# Patient Record
Sex: MALE | Race: ASIAN
[De-identification: names, ages, dates, MRNs, and addresses within clinical notes are randomized per-mention and may not be internally consistent; named-entity substitution may affect disease eponyms.]

---

## 2017-01-19 ENCOUNTER — APPOINTMENT (OUTPATIENT)
Dept: ENDOCRINOLOGY | Facility: CLINIC | Age: 67
End: 2017-01-19

## 2017-04-10 ENCOUNTER — RX RENEWAL (OUTPATIENT)
Age: 67
End: 2017-04-10

## 2017-06-14 ENCOUNTER — RX RENEWAL (OUTPATIENT)
Age: 67
End: 2017-06-14

## 2017-08-14 ENCOUNTER — MEDICATION RENEWAL (OUTPATIENT)
Age: 67
End: 2017-08-14

## 2017-08-14 RX ORDER — BLOOD SUGAR DIAGNOSTIC
STRIP MISCELLANEOUS
Qty: 500 | Refills: 3 | Status: ACTIVE | COMMUNITY
Start: 2017-08-14 | End: 1900-01-01

## 2017-08-14 RX ORDER — LANCETS 33 GAUGE
EACH MISCELLANEOUS
Qty: 500 | Refills: 3 | Status: ACTIVE | COMMUNITY
Start: 2017-08-14 | End: 1900-01-01

## 2017-08-14 RX ORDER — BLOOD-GLUCOSE METER
W/DEVICE KIT MISCELLANEOUS
Qty: 1 | Refills: 1 | Status: ACTIVE | COMMUNITY
Start: 2017-08-14 | End: 1900-01-01

## 2018-04-16 ENCOUNTER — RX RENEWAL (OUTPATIENT)
Age: 68
End: 2018-04-16

## 2019-04-30 ENCOUNTER — RX RENEWAL (OUTPATIENT)
Age: 69
End: 2019-04-30

## 2020-04-25 ENCOUNTER — MESSAGE (OUTPATIENT)
Age: 70
End: 2020-04-25

## 2022-11-05 ENCOUNTER — OFFICE (OUTPATIENT)
Dept: URBAN - METROPOLITAN AREA CLINIC 88 | Facility: CLINIC | Age: 72
Setting detail: OPHTHALMOLOGY
End: 2022-11-05
Payer: COMMERCIAL

## 2022-11-05 DIAGNOSIS — H43.813: ICD-10-CM

## 2022-11-05 DIAGNOSIS — E11.3513: ICD-10-CM

## 2022-11-05 DIAGNOSIS — H35.372: ICD-10-CM

## 2022-11-05 PROCEDURE — 67028 INJECTION EYE DRUG: CPT | Performed by: OPHTHALMOLOGY

## 2022-11-05 PROCEDURE — 92134 CPTRZ OPH DX IMG PST SGM RTA: CPT | Performed by: OPHTHALMOLOGY

## 2022-11-05 ASSESSMENT — KERATOMETRY
OD_K2POWER_DIOPTERS: 44.25
OS_K2POWER_DIOPTERS: 44.75
OS_AXISANGLE_DEGREES: 006
OD_AXISANGLE_DEGREES: 122
OD_K1POWER_DIOPTERS: 43.00
OS_K1POWER_DIOPTERS: 43.75

## 2022-11-05 ASSESSMENT — REFRACTION_AUTOREFRACTION
OD_SPHERE: +1.00
OS_SPHERE: +1.50
OS_AXIS: 092
OD_CYLINDER: -1.50
OD_AXIS: 042
OS_CYLINDER: -2.00

## 2022-11-05 ASSESSMENT — TONOMETRY
OD_IOP_MMHG: 16
OS_IOP_MMHG: 18

## 2022-11-05 ASSESSMENT — LID POSITION - DERMATOCHALASIS
OS_DERMATOCHALASIS: LUL 2+ 3+
OD_DERMATOCHALASIS: RUL 2+ 3+

## 2022-11-05 ASSESSMENT — VISUAL ACUITY
OS_BCVA: 20/40
OD_BCVA: 20/40

## 2022-11-05 ASSESSMENT — LID POSITION - PTOSIS
OD_PTOSIS: RUL 2+ 3+
OS_PTOSIS: LUL 2+ 3+

## 2022-11-05 ASSESSMENT — SPHEQUIV_DERIVED
OD_SPHEQUIV: 0.25
OS_SPHEQUIV: 0.5

## 2022-11-05 ASSESSMENT — CONFRONTATIONAL VISUAL FIELD TEST (CVF)
OS_FINDINGS: FULL
OD_FINDINGS: FULL

## 2022-11-05 ASSESSMENT — AXIALLENGTH_DERIVED
OS_AL: 23.1308
OD_AL: 23.4495

## 2022-11-05 ASSESSMENT — CORNEAL PTERYGIUM: OS_PTERYGIUM: NASAL 1MM

## 2023-04-06 ENCOUNTER — OFFICE (OUTPATIENT)
Dept: URBAN - METROPOLITAN AREA CLINIC 12 | Facility: CLINIC | Age: 73
Setting detail: OPHTHALMOLOGY
End: 2023-04-06
Payer: COMMERCIAL

## 2023-04-06 DIAGNOSIS — H52.7: ICD-10-CM

## 2023-04-06 DIAGNOSIS — E11.3511: ICD-10-CM

## 2023-04-06 DIAGNOSIS — H11.151: ICD-10-CM

## 2023-04-06 DIAGNOSIS — H43.813: ICD-10-CM

## 2023-04-06 DIAGNOSIS — H02.831: ICD-10-CM

## 2023-04-06 DIAGNOSIS — H11.042: ICD-10-CM

## 2023-04-06 DIAGNOSIS — H02.834: ICD-10-CM

## 2023-04-06 DIAGNOSIS — H35.372: ICD-10-CM

## 2023-04-06 DIAGNOSIS — H02.403: ICD-10-CM

## 2023-04-06 DIAGNOSIS — H40.013: ICD-10-CM

## 2023-04-06 DIAGNOSIS — E11.3512: ICD-10-CM

## 2023-04-06 PROCEDURE — 92250 FUNDUS PHOTOGRAPHY W/I&R: CPT | Performed by: STUDENT IN AN ORGANIZED HEALTH CARE EDUCATION/TRAINING PROGRAM

## 2023-04-06 PROCEDURE — 92015 DETERMINE REFRACTIVE STATE: CPT | Performed by: STUDENT IN AN ORGANIZED HEALTH CARE EDUCATION/TRAINING PROGRAM

## 2023-04-06 PROCEDURE — 92014 COMPRE OPH EXAM EST PT 1/>: CPT | Performed by: STUDENT IN AN ORGANIZED HEALTH CARE EDUCATION/TRAINING PROGRAM

## 2023-04-06 ASSESSMENT — KERATOMETRY
OS_K2POWER_DIOPTERS: 44.25
OS_AXISANGLE_DEGREES: 23
OD_K1POWER_DIOPTERS: 42.50
OD_AXISANGLE_DEGREES: 149
OD_K2POWER_DIOPTERS: 45.00
OS_K1POWER_DIOPTERS: 43.50

## 2023-04-06 ASSESSMENT — REFRACTION_AUTOREFRACTION
OD_SPHERE: +2.25
OD_CYLINDER: -1.50
OD_AXIS: 84
OS_SPHERE: +1.25
OS_AXIS: 94
OS_CYLINDER: -1.50

## 2023-04-06 ASSESSMENT — TONOMETRY: OS_IOP_MMHG: 19

## 2023-04-06 ASSESSMENT — REFRACTION_CURRENTRX
OD_VPRISM_DIRECTION: SV
OS_AXIS: 47
OD_SPHERE: +4.00
OD_OVR_VA: 20/
OS_CYLINDER: -1.25
OD_CYLINDER: -1.25
OS_OVR_VA: 20/
OS_VPRISM_DIRECTION: SV
OD_AXIS: 50
OS_SPHERE: +4.00

## 2023-04-06 ASSESSMENT — REFRACTION_MANIFEST
OS_SPHERE: +1.25
OD_CYLINDER: -1.50
OS_ADD: +3.00
OD_ADD: +3.00
OS_CYLINDER: -1.50
OD_SPHERE: +2.50
OS_VA1: 20/40-2
OS_AXIS: 095
OD_VA1: 20/60+2
OD_AXIS: 075

## 2023-04-06 ASSESSMENT — CONFRONTATIONAL VISUAL FIELD TEST (CVF)
OS_FINDINGS: FULL
OD_FINDINGS: FULL

## 2023-04-06 ASSESSMENT — VISUAL ACUITY
OS_BCVA: 20/60
OD_BCVA: 20/40-2

## 2023-04-06 ASSESSMENT — CORNEAL PTERYGIUM: OS_PTERYGIUM: NASAL 1MM

## 2023-04-06 ASSESSMENT — SPHEQUIV_DERIVED
OD_SPHEQUIV: 1.5
OS_SPHEQUIV: 0.5
OS_SPHEQUIV: 0.5
OD_SPHEQUIV: 1.75

## 2023-04-06 ASSESSMENT — AXIALLENGTH_DERIVED
OS_AL: 23.264
OD_AL: 22.8428
OD_AL: 22.9345
OS_AL: 23.264

## 2023-04-06 ASSESSMENT — LID POSITION - PTOSIS
OD_PTOSIS: RUL 2+ 3+
OS_PTOSIS: LUL 2+ 3+

## 2023-04-06 ASSESSMENT — LID POSITION - DERMATOCHALASIS
OD_DERMATOCHALASIS: RUL 2+ 3+
OS_DERMATOCHALASIS: LUL 2+ 3+

## 2024-07-24 ENCOUNTER — APPOINTMENT (OUTPATIENT)
Dept: ORTHOPEDIC SURGERY | Facility: CLINIC | Age: 74
End: 2024-07-24
Payer: COMMERCIAL

## 2024-07-24 VITALS — HEIGHT: 68 IN | BODY MASS INDEX: 19.85 KG/M2 | WEIGHT: 131 LBS

## 2024-07-24 DIAGNOSIS — M25.561 PAIN IN RIGHT KNEE: ICD-10-CM

## 2024-07-24 DIAGNOSIS — M79.18 MYALGIA, OTHER SITE: ICD-10-CM

## 2024-07-24 DIAGNOSIS — G89.29 PAIN IN RIGHT KNEE: ICD-10-CM

## 2024-07-24 DIAGNOSIS — M25.562 PAIN IN LEFT KNEE: ICD-10-CM

## 2024-07-24 PROCEDURE — 73564 X-RAY EXAM KNEE 4 OR MORE: CPT | Mod: 50

## 2024-07-24 PROCEDURE — 99204 OFFICE O/P NEW MOD 45 MIN: CPT

## 2024-07-24 PROCEDURE — 72100 X-RAY EXAM L-S SPINE 2/3 VWS: CPT

## 2024-07-24 RX ORDER — CLOPIDOGREL 75 MG/1
TABLET, FILM COATED ORAL
Refills: 0 | Status: ACTIVE | COMMUNITY

## 2024-07-24 NOTE — DISCUSSION/SUMMARY
[de-identified] : Reviewed all X Ray images with patient today and interpretation was provided. Patient was given prescription of formal physical therapy that he will perform 2x/wk for 6-8 wks.  Explained to the patient that their pain could potentially be coming from the lumbar spine.  MRI of lumbar spine stenosis as a cause of his bilateral thigh weakness Follow up after MRI scan with spine services.  if weakness is not of spinal origin other possiblities include neurologic or vascular causes     ----------------------------------------------- Home Exercise The patient is instructed on a home exercise program.   TEO REAVES Acting as a Scribe for Dr. Cassie MARIO, Teo Reaves, attest that this documentation has been prepared under the direction and in the presence of Provider Dr. Matthews.   Activity Modification The patient was advised to modify their activities.   Dx / Natural History The patient was advised of the diagnosis.  The natural history of the pathology was explained in full to the patient in layman's terms.  Several different treatment options were discussed and explained in full to the patient including the risks and benefits of both surgical and non-surgical treatments.  All questions and concerns were answered.   Pain Guide Activities The patient was advised to let pain guide the gradual advancement of activities.   RICE I explained to the patient that rest, ice, compression, and elevation would benefit them.  They may return to activity after follow-up or when they no longer have any pain.   The patient's current medication management of their orthopedic diagnosis was reviewed today: (1) We discussed a comprehensive treatment plan that included possible pharmaceutical management involving the use of prescription strength medications including but not limited to options such as oral Naprosyn 500mg BID, once daily Meloxicam 15 mg, or 500-650 mg Tylenol versus over the counter oral medications and topical prescription NSAID Pennsaid vs over the counter Voltaren gel. (2) There is a moderate risk of morbidity with further treatment, especially from use of prescription strength medications and possible side effects of these medications which include upset stomach with oral medications, skin reactions to topical medications and cardiac/renal issues with long term use. (3) I recommended that the patient follow-up with their medical physician to discuss any significant specific potential issues with long term medication use such as interactions with current medications or with exacerbation of underlying medical comorbidities. (4) The benefits and risks associated with use of injectable, oral or topical, prescription and over the counter anti-inflammatory medications were discussed with the patient. The patient voiced understanding of the risks including but not limited to bleeding, stroke, kidney dysfunction, heart disease, and were referred to the black box warning label for further information.

## 2024-07-24 NOTE — HISTORY OF PRESENT ILLNESS
[de-identified] : The patient is a 73 year old [RIGHT/LEFT] hand dominant male who presents today complaining of BL knees.   Date of Injury/Onset: about 10 days ago  Pain:    At Rest: 0/10  With Activity:  7/10  Mechanism of injury: NKI  This is NOT a Work Related Injury being treated under Worker's Compensation. This is NOT an athletic injury occurring associated with an interscholastic or organized sports team. Quality of symptoms: tightness, aching  Improves with: walking around  Worse with: lifting his legs, bending forward, sitting  Prior treatment: n/a Prior Imaging: n/a Out of work/sport: _, since _ School/Sport/Position/Occupation:[employer] Additional Information: None

## 2024-07-24 NOTE — PHYSICAL EXAM
[de-identified] : Neurovascularly intact distally   Bilateral Knee: Full ROM Ligamental stable  Nontender no effusion  -homens test  Lumbar Spine: Radicular pain down the left/right legs Full ROM of back with pain

## 2024-07-24 NOTE — ADDENDUM
[FreeTextEntry1] :  Documented by Alycia Reaves acting as a scribe for Dr. Matthews and Tony Staton PA-C on 07/24/2024 and was presence for the following sections: Physical Exam; Data Reviewed; Assessment; Discussion/Summary. All medical record entries made by the Scribe were at my, Dr. Matthews, and Tony Staton, direction and personally dictated by me on 07/24/2024. I have reviewed the chart and agree that the record accurately reflects my personal performance of the history, physical exam, procedure and imaging.

## 2024-07-24 NOTE — DATA REVIEWED
[FreeTextEntry1] : 7/24/24 OC X RAY Bilateral Knee: 4 view: This scan was reviewed and interpreted by Dr. Matthews, and his findings are-  unremarkable   7/24/24 OC X RAY Lumbar Spine: 3 view: This scan was reviewed and interpreted by Dr. Matthews, and his findings are-   L5 S6 severe DDD

## 2024-07-29 ENCOUNTER — APPOINTMENT (OUTPATIENT)
Dept: MRI IMAGING | Facility: CLINIC | Age: 74
End: 2024-07-29
Payer: COMMERCIAL

## 2024-07-29 PROCEDURE — 72148 MRI LUMBAR SPINE W/O DYE: CPT

## 2024-07-31 ENCOUNTER — TRANSCRIPTION ENCOUNTER (OUTPATIENT)
Age: 74
End: 2024-07-31

## 2024-08-02 ENCOUNTER — APPOINTMENT (OUTPATIENT)
Dept: ORTHOPEDIC SURGERY | Facility: CLINIC | Age: 74
End: 2024-08-02
Payer: COMMERCIAL

## 2024-08-02 VITALS — WEIGHT: 131 LBS | HEIGHT: 68 IN | BODY MASS INDEX: 19.85 KG/M2

## 2024-08-02 DIAGNOSIS — M54.16 RADICULOPATHY, LUMBAR REGION: ICD-10-CM

## 2024-08-02 PROCEDURE — 99244 OFF/OP CNSLTJ NEW/EST MOD 40: CPT

## 2024-08-03 ENCOUNTER — APPOINTMENT (OUTPATIENT)
Dept: MRI IMAGING | Facility: CLINIC | Age: 74
End: 2024-08-03

## 2024-08-03 NOTE — DATA REVIEWED
[MRI] : MRI [Lumbar Spine] : lumbar spine [Report was reviewed and noted in the chart] : The report was reviewed and noted in the chart [I independently reviewed and interpreted images and report] : I independently reviewed and interpreted images and report [I reviewed the films/CD and additionally noted] : I reviewed the films/CD and additionally noted [FreeTextEntry1] : I stop paperwork reviewed Orthopedic progress notes reviewed

## 2024-08-03 NOTE — HISTORY OF PRESENT ILLNESS
[de-identified] : 08/02/2024 - 72 y/o M presenting for initial evaluation of weakness and pain into the bilateral lower extremities, sudden onset starting 2 weeks ago. No trauma or injury. Difficult getting in and out of car as he has to physically pick his legs up to move then. This has resulted in him using a walker. He has no loss of strength in his arms. Admits to minor low back pain, but primarily limited due to his lower extremity symptoms. Pain worse standing and walking, some relief sitting. Has control of bowel / bladder. Balance normal. Takes Tylenol prn for pain control. Has been oow since Monday due to symptoms. He is on plavix, baby aspirin. Cardiac stent Dec 2023. Controlled Diabetic.   The patient is a 73 year male who presents today complaining of low back pain, and b/l thigh/ calf weakness Date of Injury/Onset: 2 weeks Pain:    At Rest: 0/10  With Activity:  7/10  Mechanism of injury: NKI Quality of symptoms: tightness, achy, weakness Improves with: n/a Worse with: sitting to standing Prior treatment: n/a Prior Imaging: mri lumbar spine [facility], xrays [facility] Additional Information: None

## 2024-08-03 NOTE — DISCUSSION/SUMMARY
[de-identified] : The patient presents with acute lower extremity weakness, pain, and deterioration in walking. MRI reveals mild stenosis at L3/4 and L4/5. It was discussed that the nerve impingement evident on the MRI does not correlate with the severity of his symptoms.  Plan: An MRI of the thoracic spine is requested to evaluate for spinal cord compression. Recommend a consultation with a vascular specialist to rule out vascular abnormalities/arterial insufficiency, as the clinical examination demonstrates absent pulses. ALISSA referral issued He will follow up with PCP tomorrow. Patient remains temporarily and totally disabled from customary work at this time.  Follow-Up: Schedule a follow-up appointment after the thoracic MRI to reassess  Prior to appointment and during encounter with patient extensive medical records were reviewed including but not limited to, hospital records, outpatient records, imaging results, and lab data. During this appointment the patient was examined, diagnoses were discussed and explained in a face to face manner. In addition extensive time was spent reviewing aforementioned diagnostic studies. Counseling including abnormal image results, differential diagnoses, treatment options, risk and benefits, lifestyle changes, current condition, and current medications was performed. Patient's comments, questions, and concerns were addressed and patient verbalized understanding. Based on this patient's presentation at our office, which is an orthopedic spine surgeon's office, this patient inherently / intrinsically has a risk, however minute, of developing issues such as Cauda equina syndrome, bowel and bladder changes, or progression of motor or neurological deficits such as paralysis which may be permanent.  SNOW BUSTOS Acting as a Scribe for Dr. Stan MARIO, Snow Bustos, attest that this documentation has been prepared under the direction and in the presence of Provider Karl Pierce MD.

## 2024-08-03 NOTE — PHYSICAL EXAM
[Normal Coordination] : normal coordination [Normal DTR UE/LE] : normal DTR UE/LE  [Normal Sensation] : normal sensation [Normal Mood and Affect] : normal mood and affect [Oriented] : oriented [Able to Communicate] : able to communicate [Normal Skin] : normal skin [No Rash] : no rash [No Ulcers] : no ulcers [No Lesions] : no lesions [No obvious lymphadenopathy in areas examined] : no obvious lymphadenopathy in areas examined [Well Developed] : well developed [Peripheral vascular exam is grossly normal] : peripheral vascular exam is grossly normal [No Respiratory Distress] : no respiratory distress [Lungs clear to auscultation bilaterally] : lungs clear to auscultation bilaterally [Normal Bowel Sounds] : normal bowel sounds [Non-Tender] : non-tender [No HSM] : no HSM [No Mass] : no mass [4___] : right quadriceps 4[unfilled]/5 [5___] : right extensor hallicus longus 5[unfilled]/5 [] : clonus not sustained at ankle [de-identified] : no palpable DP or PT pulses

## 2024-08-07 ENCOUNTER — APPOINTMENT (OUTPATIENT)
Dept: ORTHOPEDIC SURGERY | Facility: CLINIC | Age: 74
End: 2024-08-07